# Patient Record
Sex: FEMALE | Race: WHITE | Employment: UNEMPLOYED | URBAN - METROPOLITAN AREA
[De-identification: names, ages, dates, MRNs, and addresses within clinical notes are randomized per-mention and may not be internally consistent; named-entity substitution may affect disease eponyms.]

---

## 2021-04-20 NOTE — TELEPHONE ENCOUNTER
Dr. Walter Riddle, patient interested in free prenatal vitamins and iron per response from [address] [facility]. Order for [facility] Baby Box pending for your convenience. Thank you,  Yasmin Cortez, PharmD  [address]  Department, toll free: [phone], option [address].      =======================================================================    [place] REVIEW - Be Well With Baby    CHERYLE Bearden is a 35 y.o. female currently identified as interested in receiving a [facility] \"Baby Box\" containing a 1 year supply of prenatal vitamins and Ferrous gluconate 324mg form [facility]. Contents of Baby Box:   Welcome Letter   3 month supply of Certifi Prenatal Gummies (Take 2 gummies once daily) with 3 refills   3 month supply of Ferrous gluconate 324mg tablets (Take 1 tablet once daily) with 3 refills    Thank you  Joslyn Cortez, Cira  [address]  Department, toll free: [phone], [facility] [address]

## 2022-03-18 PROBLEM — Z3A.01 6 WEEKS GESTATION OF PREGNANCY: Status: ACTIVE | Noted: 2021-03-25

## 2022-03-18 PROBLEM — Z3A.39 39 WEEKS GESTATION OF PREGNANCY: Status: ACTIVE | Noted: 2021-11-09

## 2022-07-22 PROBLEM — Z3A.01 6 WEEKS GESTATION OF PREGNANCY: Status: RESOLVED | Noted: 2021-03-25 | Resolved: 2022-07-22

## 2022-07-22 PROBLEM — Z34.90 PREGNANT: Status: ACTIVE | Noted: 2022-07-22

## 2023-02-20 PROBLEM — O26.893 ABDOMINAL PAIN DURING PREGNANCY IN THIRD TRIMESTER: Status: ACTIVE | Noted: 2023-02-20

## 2023-02-20 PROBLEM — R10.9 ABDOMINAL PAIN DURING PREGNANCY IN THIRD TRIMESTER: Status: ACTIVE | Noted: 2023-02-20

## 2023-02-20 PROBLEM — Z3A.40 40 WEEKS GESTATION OF PREGNANCY: Status: ACTIVE | Noted: 2023-02-20

## 2023-09-25 ENCOUNTER — OFFICE VISIT (OUTPATIENT)
Age: 33
End: 2023-09-25

## 2023-09-25 VITALS
HEIGHT: 63 IN | DIASTOLIC BLOOD PRESSURE: 84 MMHG | WEIGHT: 123 LBS | BODY MASS INDEX: 21.79 KG/M2 | SYSTOLIC BLOOD PRESSURE: 122 MMHG

## 2023-09-25 DIAGNOSIS — Z01.419 ENCOUNTER FOR GYNECOLOGICAL EXAMINATION WITHOUT ABNORMAL FINDING: Primary | ICD-10-CM

## 2023-09-25 PROCEDURE — 99395 PREV VISIT EST AGE 18-39: CPT | Performed by: OBSTETRICS & GYNECOLOGY

## 2023-09-25 RX ORDER — VENLAFAXINE HYDROCHLORIDE 37.5 MG/1
37.5 CAPSULE, EXTENDED RELEASE ORAL DAILY
COMMUNITY
Start: 2023-04-11

## 2023-09-30 LAB
CYTOLOGIST CVX/VAG CYTO: NORMAL
CYTOLOGY CVX/VAG DOC CYTO: NORMAL
CYTOLOGY CVX/VAG DOC THIN PREP: NORMAL
DX ICD CODE: NORMAL
HPV GENOTYPE REFLEX: NORMAL
HPV I/H RISK 4 DNA CVX QL PROBE+SIG AMP: NEGATIVE
Lab: NORMAL
OTHER STN SPEC: NORMAL
STAT OF ADQ CVX/VAG CYTO-IMP: NORMAL